# Patient Record
Sex: FEMALE | Race: WHITE
[De-identification: names, ages, dates, MRNs, and addresses within clinical notes are randomized per-mention and may not be internally consistent; named-entity substitution may affect disease eponyms.]

---

## 2017-02-12 ENCOUNTER — HOSPITAL ENCOUNTER (EMERGENCY)
Dept: HOSPITAL 62 - ER | Age: 37
Discharge: HOME | End: 2017-02-12
Payer: MEDICAID

## 2017-02-12 VITALS — DIASTOLIC BLOOD PRESSURE: 89 MMHG | SYSTOLIC BLOOD PRESSURE: 117 MMHG

## 2017-02-12 DIAGNOSIS — Z87.891: ICD-10-CM

## 2017-02-12 DIAGNOSIS — M54.41: Primary | ICD-10-CM

## 2017-02-12 DIAGNOSIS — E03.9: ICD-10-CM

## 2017-02-12 DIAGNOSIS — M79.604: ICD-10-CM

## 2017-02-12 DIAGNOSIS — E11.9: ICD-10-CM

## 2017-02-12 DIAGNOSIS — I10: ICD-10-CM

## 2017-02-12 DIAGNOSIS — Z90.49: ICD-10-CM
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
## 2017-02-12 DIAGNOSIS — Z90.710: ICD-10-CM

## 2017-02-12 PROCEDURE — 99284 EMERGENCY DEPT VISIT MOD MDM: CPT

## 2017-02-12 PROCEDURE — 72110 X-RAY EXAM L-2 SPINE 4/>VWS: CPT

## 2017-02-12 NOTE — ER DOCUMENT REPORT
ED Medical Screen (RME)





- General


Stated Complaint: FALL;BACK PAIN


Time seen by provider: 16:25


Mode of Arrival: Wheelchair


Information source: Patient


Notes: 


36-year-old female presents to ED for fall outside today.  States she landed on 

her back and now she has pain in her back and and right leg.  Denies chronic 

back pain.  She has a history reflux.  States she had a hysterectomy.











I have greeted and performed a rapid initial assessment of this patient.  A 

comprehensive ED assessment and evaluation of the patient, analysis of test 

results and completion of medical decision making process will be conducted by 

an additional ED providers.


TRAVEL OUTSIDE OF THE [place] IN LAST 30 DAYS: No





- Related Data


Allergies/Adverse Reactions: 


 





No Known Allergies Allergy (Unverified 06/09/15 09:33)


 











Past Medical History





- Past Medical History


Cardiac Medical History: Reports: Hx Hypertension


Endocrine Medical History: Reports: Hx Diabetes Mellitus Type 2 - diet control


GI Medical History: Reports: Hx Gastroesophageal Reflux Disease


Past Surgical History: Reports: Hx Cholecystectomy, Hx Gynecologic Surgery - c-

section x3, Hx Hysterectomy





- Immunizations


Immunizations up to date: Yes


Hx Diphtheria, Pertussis, Tetanus Vaccination: Yes





Physical Exam





- Vital signs


Vitals: 





 











Temp Pulse Resp BP Pulse Ox


 


 98.0 F   83   16   123/90 H  97 


 


 02/12/17 16:06  02/12/17 16:06  02/12/17 16:06  02/12/17 16:06  02/12/17 16:06














Course





- Vital Signs


Vital signs: 





 











Temp Pulse Resp BP Pulse Ox


 


 98.0 F   83   16   123/90 H  97 


 


 02/12/17 16:06  02/12/17 16:06  02/12/17 16:06  02/12/17 16:06  02/12/17 16:06

## 2017-02-12 NOTE — ER DOCUMENT REPORT
ED Neck/Back Problem





- General


Chief Complaint: Fall


Stated Complaint: FALL;BACK PAIN


Time seen by provider: 18:17


Mode of Arrival: Wheelchair


Information source: Patient


Notes: 


36-year-old female presents to ED for neck pain after falling outside today.  

She states she landed on her back and now her back hurts with her right leg.  

Has a history of reflux.


TRAVEL OUTSIDE OF THE [place] IN LAST 30 DAYS: No





- HPI


Patient complains to provider of: Pain, Injury, Lower back


Onset: This morning


Where: Outdoors


Onset: Sudden


Timing: Still present


Quality of pain: Sharp


Severity: Moderate


Pain Level: 4


Recent injury: Yes


Associated symptoms: Radiation to leg, Lower back pain.  denies: Incontinence, 

Unable to urinate


Exacerbated by: Movement of trunk


Similar symptoms previously: Yes


Recently seen / treated by doctor: No





- Related Data


Allergies/Adverse Reactions: 


 





No Known Allergies Allergy (Verified 02/12/17 16:27)


 











Past Medical History





- General


Information source: Patient





- Social History


Smoking Status: Former Smoker


Cigarette use (# per day): No


Chew tobacco use (# tins/day): No


Smoking Education Provided: No


Frequency of alcohol use: None


Drug Abuse: None


Occupation: none


Lives with: Family


Family History: Arthritis, Malignancy, Thyroid Disfunction.  denies: CAD, COPD, 

CVA, DM, Hyperlipidemia, Hypertension


Patient has suicidal ideation: No


Patient has homicidal ideation: No





- Past Medical History


Cardiac Medical History: Reports: Hx Hypertension


Pulmonary Medical History: Reports: None


EENT Medical History: Reports: None


Neurological Medical History: Reports: None


Endocrine Medical History: Reports: Hx Diabetes Mellitus Type 2 - diet control, 

Hx Hypothyroidism


Renal/ Medical History: Reports: None


Malignancy Medical History: Reports: None


GI Medical History: Reports: Hx Gastroesophageal Reflux Disease


Musculoskeltal Medical History: Reports Hx Musculoskeletal Deformity, Reports 

Hx Musculoskeletal Trauma


Skin Medical History: Reports None


Psychiatric Medical History: Reports: Hx Anxiety


Traumatic Medical History: Reports: None


Infectious Medical History: Reports: None


Past Surgical History: Reports: Hx Cholecystectomy, Hx Gynecologic Surgery - c-

section x3, Hx Hysterectomy, Hx Thyroid Surgery - Thyroidectomy





- Immunizations


Immunizations up to date: Yes


Hx Diphtheria, Pertussis, Tetanus Vaccination: Yes


History of Influenza Vaccine for 10/2016 - 3/2017 Season: No





Review of Systems





- Review of Systems


Constitutional: No symptoms reported


EENT: No symptoms reported


Cardiovascular: No symptoms reported


Respiratory: No symptoms reported


Gastrointestinal: No symptoms reported


Genitourinary: No symptoms reported


Female Genitourinary: No symptoms reported


Musculoskeletal: Back pain - Lower back radiating down her right leg, Muscle 

pain


Skin: No symptoms reported


Hematologic/Lymphatic: No symptoms reported


Neurological/Psychological: No symptoms reported


-: Yes All other systems reviewed and negative





Physical Exam





- Vital signs


Vitals: 





 











Temp Pulse Resp BP Pulse Ox


 


 98.0 F   83   16   123/90 H  97 


 


 02/12/17 16:06  02/12/17 16:06  02/12/17 16:06  02/12/17 16:06  02/12/17 16:06











Interpretation: Normal





- General


General appearance: Appears well, Alert





- HEENT


Head: Normocephalic, Atraumatic


Eyes: Normal


Pupils: PERRL





- Respiratory


Respiratory status: No respiratory distress


Chest status: Nontender


Breath sounds: Normal


Chest palpation: Normal





- Cardiovascular


Rhythm: Regular


Heart sounds: Normal auscultation


Murmur: No





- Abdominal


Inspection: Normal


Distension: No distension


Bowel sounds: Normal


Tenderness: Nontender


Organomegaly: No organomegaly





- Back


Back: Normal, Nontender, Tender, Vertebra tenderness.  No: Deformity/step-off, 

CVA tenderness, Scars, Scoliosis, Wounds





- Extremities


General upper extremity: Normal inspection, Nontender, Normal color, Normal ROM

, Normal temperature


General lower extremity: Normal inspection, Nontender, Normal color, Normal ROM

, Normal temperature, Normal weight bearing.  No: Gayle's sign





- Neurological


Neuro grossly intact: Yes


Cognition: Normal


Orientation: AAOx4


[place] Coma Scale Eye Opening: Spontaneous


Lin Coma Scale Verbal: Oriented


Lin Coma Scale Motor: Obeys Commands


[place] Coma Scale Total: 15


Speech: Normal


Motor strength normal: LUE, RUE, LLE, RLE


Sensory: Normal





- Psychological


Associated symptoms: Normal affect, Normal mood





- Skin


Skin Temperature: Warm


Skin Moisture: Dry


Skin Color: Normal





Course





- Re-evaluation


Re-evalutation: 





02/12/17 18:23


No cauda equina.  Patient has some numbness down her right leg to her toe.  

Pain also radiates down the right side.  No loss of sensation no loss of 

control of bowel or bladder.  Treated patient with some ibuprofen in the 

emergency room we'll discharge home with some Flexeril and hydrocodone and have 

follow-up with her primary doctor.





- Vital Signs


Vital signs: 





 











Temp Pulse Resp BP Pulse Ox


 


 98.0 F   83   16   123/90 H  97 


 


 02/12/17 16:06  02/12/17 16:06  02/12/17 16:06  02/12/17 16:06  02/12/17 16:06














- Diagnostic Test


Radiology reviewed: Image reviewed, Reports reviewed





Discharge





- Discharge


Clinical Impression: 


Low back pain


Qualifiers:


 Chronicity: acute Back pain laterality: midline Sciatica presence: with 

sciatica Sciatica laterality: sciatica of right side Qualified Code(s): M54.41 

- Lumbago with sciatica, right side





Condition: Stable


Disposition: HOME, SELF-CARE


Instructions:  Stretching Exercises for the Back (Critical access hospital), Family Physicians / 

Practices


Additional Instructions: 


LOW BACK PAIN:





     Three out of every four people will have an episode of disabling back pain 

during their lifetime.  Most commonly the pain is due to straining of the 

muscles and ligaments in the low back.


     Usual treatment includes: 


(1) Rest on a firm surface.  Avoid lying on your stomach.  


(2) Ice pack the painful area.  After a few days, gentle heat may be used 

intermittently to relax the area, or ice packs can be continued.  


(3) Medication may be needed -- muscle relaxers and antiinflammatory medicines 

are commonly used.  


(4) As the back improves, exercises are prescribed to strengthen the back and 

abdominal muscles.


     Your doctor will advise you on the proper care for your back at each stage 

in your recovery.  You may be better in a few days -- or healing may take 

several weeks.


     If new symptoms of a "herniated disc" (radiation of pain, numbness, or 

tingling down the back of the leg or weakness in the leg) occur, you should be 

re-examined.  Further testing may be necessary.








ORAL NARCOTIC MEDICATION:


     You have been given a prescription for pain control.  This medication is a 

narcotic.  It's best taken with food, as nausea can result if taken on an empty 

stomach.


     Don't operate machinery or drive within six hours of taking this 

medication.  Do not combine this medicine with alcohol, or with any medication 

which can cause sedation (such as cold tablets or sleeping pills) unless you 

get permission from the physician.


     Narcotics tend to cause constipation.  If possible, drink plenty of fluids 

and eat a diet high in fiber and fruits.





     Please be aware that prescription narcotics also have the potential for 

abuse.  People become addicted to these medications because of the general 

sense of wellbeing that they induce.  This feeling along with a significant 

reduction in tension, anxiety, and aggression provides a stimulating seductive 

quality to these drugs.  Once your pain is under control, we encourage you to 

discard your unused narcotics.








MUSCLE RELAXERS: 


     Muscle relaxing medications are usually prescribed for acute muscle spasm 

or injury to the neck and back.  They are often combined with antiinflammatory 

pain medication for increased relief.


     You may stop the muscle relaxer when the pain and stiffness have improved.

  Start the medication again if spasms recur.  


     Muscle relaxers may cause drowsiness, especially with the first dose.  Do 

not operate machinery or drive while under the effects of the medication.  Most 

muscle relaxers last up to 24 hours.  Do not combine the medication with 

alcohol.








ICE PACKS:


     Apply ice packs frequently against the painful area.  Many different 

schedules are recommended, such as "20 minutes on, 20 minutes off" or "one hour 

ice, two hours rest."  If you need to work, you may need to go longer between 

ice treatments.  You should plan to have the area ice packed AT LEAST one 

fourth of the time.


     The ice should be applied over the wrap, tape, or splint, or over a layer 

of cloth -- not directly against the skin.  Some ice bags have a built-in cloth 

and can be put directly on the skin.





WARM PACKS:


     After approximately two days, apply gentle heat (such as a heating pad or 

hot water bottle) for about 20 to 30 minutes about every two hours -- at least 

four times daily.  Warmth and elevation will help you make a more rapid recovery

, and will ease the pain considerably.


     Do not use HOT heat, and never apply heat for longer than 30 minutes.  The 

continuous heat can invisibly damage skin and muscles -- even when no burn is 

seen on the surface.  Damaged muscles can make you MORE sore.








FOLLOW-UP CARE:


If you have been referred to a physician for follow-up care, call the physician

s office for an appointment as you were instructed or within the next two days.

  If you experience worsening or a significant change in your symptoms, notify 

the physician immediately or return to the Emergency Department at any time for 

re-evaluation.


Prescriptions: 


Hydrocodone/Acetaminophen [Norco 5-325 mg Tablet] 1 tab PO Q6HP PRN #14 tablet


 PRN Reason: 


Ibuprofen 600 mg PO Q6HP PRN #20 tablet


 PRN Reason: 


Cyclobenzaprine HCl [Flexeril 5 mg Tablet] 5 mg PO TID #15 tablet


Forms:  Elevated Blood Pressure

## 2017-12-04 ENCOUNTER — HOSPITAL ENCOUNTER (EMERGENCY)
Dept: HOSPITAL 62 - ER | Age: 37
LOS: 1 days | Discharge: HOME | End: 2017-12-05
Payer: SELF-PAY

## 2017-12-04 DIAGNOSIS — M79.604: ICD-10-CM

## 2017-12-04 DIAGNOSIS — R60.0: ICD-10-CM

## 2017-12-04 DIAGNOSIS — M79.605: ICD-10-CM

## 2017-12-04 DIAGNOSIS — R53.83: ICD-10-CM

## 2017-12-04 DIAGNOSIS — R20.0: ICD-10-CM

## 2017-12-04 DIAGNOSIS — F17.200: ICD-10-CM

## 2017-12-04 DIAGNOSIS — R07.9: Primary | ICD-10-CM

## 2017-12-04 DIAGNOSIS — Z79.899: ICD-10-CM

## 2017-12-04 DIAGNOSIS — E03.9: ICD-10-CM

## 2017-12-04 LAB
ALBUMIN SERPL-MCNC: 4.6 G/DL (ref 3.5–5)
ALP SERPL-CCNC: 76 U/L (ref 38–126)
ALT SERPL-CCNC: 32 U/L (ref 9–52)
ANION GAP SERPL CALC-SCNC: 11 MMOL/L (ref 5–19)
APPEARANCE UR: (no result)
AST SERPL-CCNC: 31 U/L (ref 14–36)
BACTERIA #/AREA URNS HPF: (no result) /HPF
BASOPHILS # BLD AUTO: 0.1 10^3/UL (ref 0–0.2)
BASOPHILS NFR BLD AUTO: 0.6 % (ref 0–2)
BILIRUB DIRECT SERPL-MCNC: 0.3 MG/DL (ref 0–0.4)
BILIRUB SERPL-MCNC: 0.3 MG/DL (ref 0.2–1.3)
BILIRUB UR QL STRIP: NEGATIVE
BUN SERPL-MCNC: 12 MG/DL (ref 7–20)
CALCIUM: 8.2 MG/DL (ref 8.4–10.2)
CHLORIDE SERPL-SCNC: 103 MMOL/L (ref 98–107)
CO2 SERPL-SCNC: 29 MMOL/L (ref 22–30)
CREAT SERPL-MCNC: 0.77 MG/DL (ref 0.52–1.25)
EOSINOPHIL # BLD AUTO: 0.3 10^3/UL (ref 0–0.6)
EOSINOPHIL NFR BLD AUTO: 3.3 % (ref 0–6)
ERYTHROCYTE [DISTWIDTH] IN BLOOD BY AUTOMATED COUNT: 16.2 % (ref 11.5–14)
GLUCOSE SERPL-MCNC: 86 MG/DL (ref 75–110)
GLUCOSE UR STRIP-MCNC: NEGATIVE MG/DL
HCT VFR BLD CALC: 37.3 % (ref 36–47)
HGB BLD-MCNC: 12.4 G/DL (ref 12–15.5)
HGB HCT DIFFERENCE: -0.1
KETONES UR STRIP-MCNC: NEGATIVE MG/DL
LYMPHOCYTES # BLD AUTO: 4.4 10^3/UL (ref 0.5–4.7)
LYMPHOCYTES NFR BLD AUTO: 45.6 % (ref 13–45)
MCH RBC QN AUTO: 27.4 PG (ref 27–33.4)
MCHC RBC AUTO-ENTMCNC: 33.2 G/DL (ref 32–36)
MCV RBC AUTO: 83 FL (ref 80–97)
MONOCYTES # BLD AUTO: 0.6 10^3/UL (ref 0.1–1.4)
MONOCYTES NFR BLD AUTO: 6.6 % (ref 3–13)
NEUTROPHILS # BLD AUTO: 4.3 10^3/UL (ref 1.7–8.2)
NEUTS SEG NFR BLD AUTO: 43.9 % (ref 42–78)
NITRITE UR QL STRIP: NEGATIVE
PH UR STRIP: 6 [PH] (ref 5–9)
POTASSIUM SERPL-SCNC: 4.1 MMOL/L (ref 3.6–5)
PROT SERPL-MCNC: 7.4 G/DL (ref 6.3–8.2)
PROT UR STRIP-MCNC: NEGATIVE MG/DL
RBC # BLD AUTO: 4.52 10^6/UL (ref 3.72–5.28)
SODIUM SERPL-SCNC: 142.8 MMOL/L (ref 137–145)
SP GR UR STRIP: 1.01
UROBILINOGEN UR-MCNC: NEGATIVE MG/DL (ref ?–2)
WBC # BLD AUTO: 9.7 10^3/UL (ref 4–10.5)
WBC #/AREA URNS HPF: (no result) /HPF

## 2017-12-04 PROCEDURE — 71020: CPT

## 2017-12-04 PROCEDURE — 93005 ELECTROCARDIOGRAM TRACING: CPT

## 2017-12-04 PROCEDURE — 93010 ELECTROCARDIOGRAM REPORT: CPT

## 2017-12-04 PROCEDURE — 99285 EMERGENCY DEPT VISIT HI MDM: CPT

## 2017-12-04 PROCEDURE — 81001 URINALYSIS AUTO W/SCOPE: CPT

## 2017-12-04 PROCEDURE — 84439 ASSAY OF FREE THYROXINE: CPT

## 2017-12-04 PROCEDURE — 81025 URINE PREGNANCY TEST: CPT

## 2017-12-04 PROCEDURE — 85025 COMPLETE CBC W/AUTO DIFF WBC: CPT

## 2017-12-04 PROCEDURE — 36415 COLL VENOUS BLD VENIPUNCTURE: CPT

## 2017-12-04 PROCEDURE — 80053 COMPREHEN METABOLIC PANEL: CPT

## 2017-12-04 PROCEDURE — 84484 ASSAY OF TROPONIN QUANT: CPT

## 2017-12-04 PROCEDURE — 84443 ASSAY THYROID STIM HORMONE: CPT

## 2017-12-04 NOTE — EKG REPORT
SEVERITY:- BORDERLINE ECG -

SINUS RHYTHM

BORDERLINE T ABNORMALITIES, ANT-LAT LEADS

:

Confirmed by: Iban Acharya 04-Dec-2017 20:46:16

## 2017-12-04 NOTE — ER DOCUMENT REPORT
ED Medical Screen (RME)





- General


Chief Complaint: Chest Pain


Stated Complaint: LEFT ARM PAIN


Time Seen by Provider: 12/04/17 19:30


Notes: 





Patient states that she has had about 1 week of chest pain left arm pain 

fatigue and tiredness with bilateral leg swelling.


TRAVEL OUTSIDE OF THE [place] IN LAST 30 DAYS: No





- Related Data


Allergies/Adverse Reactions: 


 





ketorolac [From Toradol] Allergy (Verified 12/04/17 17:33)


 


morphine Allergy (Verified 12/04/17 17:33)


 








Home Medications: 


 Current Home Medications





Esomeprazole Magnesium [Nexium] 40 mg PO DAILY 12/04/17 [History]


Levothyroxine Sodium [Synthroid 0.112 mg Tablet] 112 mcg PO DAILY 12/04/17 [

History]











Past Medical History





- Social History


Chew tobacco use (# tins/day): No


Frequency of alcohol use: None


Drug Abuse: None





- Past Medical History


Cardiac Medical History: Reports: Hx Hypertension


Endocrine Medical History: Reports: Hx Diabetes Mellitus Type 2 - diet control, 

Hx Hypothyroidism


Renal/ Medical History: Denies: Hx Peritoneal Dialysis


GI Medical History: Reports: Hx Gastroesophageal Reflux Disease


Musculoskeltal Medical History: Reports Hx Musculoskeletal Deformity, Reports 

Hx Musculoskeletal Trauma


Psychiatric Medical History: Reports: Hx Anxiety


Past Surgical History: Reports: Hx Cholecystectomy, Hx Gynecologic Surgery - c-

section x3, Hx Hysterectomy, Hx Thyroid Surgery - Thyroidectomy





- Immunizations


Immunizations up to date: Yes


Hx Diphtheria, Pertussis, Tetanus Vaccination: Yes





Physical Exam





- Vital signs


Vitals: 





 











Temp Pulse Resp BP Pulse Ox


 


 97.6 F   54 L  16   113/71   100 


 


 12/04/17 17:52  12/04/17 17:52  12/04/17 17:52  12/04/17 17:52  12/04/17 17:52














Course





- Vital Signs


Vital signs: 





 











Temp Pulse Resp BP Pulse Ox


 


 97.6 F   54 L  16   113/71   100 


 


 12/04/17 17:52  12/04/17 17:52  12/04/17 17:52  12/04/17 17:52  12/04/17 17:52

## 2017-12-05 VITALS — DIASTOLIC BLOOD PRESSURE: 75 MMHG | SYSTOLIC BLOOD PRESSURE: 119 MMHG

## 2017-12-05 LAB — TSH SERPL-ACNC: 7.52 UIU/ML (ref 0.47–4.68)

## 2017-12-05 NOTE — ER DOCUMENT REPORT
ED General





- General


Chief Complaint: Chest Pain


Stated Complaint: LEFT ARM PAIN


Time Seen by Provider: 12/04/17 19:30


Notes: 





Patient is a 37-year-old female with past medical history of hypothyroidism who 

presents with 3-4 days of bilateral lower extremity pain, chest pain and 

intermittent tingling in her left upper extremity.  Patient reports that her 

main concern and the reason she came to the emergency department today is 

regarding her bilateral lower extremity edema.  She notes that it is equal and 

symmetric in both her legs and causes a tense, continuous compression feeling 

to her lower extremities.  Nothing improves or worsens her symptoms.  She 

denies any history of similar symptoms in the past.  She denies any history of 

kidney failure, CHF, liver failure, or prior thromboembolic disease.  She has 

not seen a primary care doctor regarding this concern.  She also notes for the 

past 2 days she has had a left-sided chest discomfort that is described as an 

intermittent, burning pain.  She also notes that she feels like her left arm 

intermittently falls asleep and does not notice that this appears to be 

correlated with the chest pain itself.  She has no history of DVT or pulmonary 

embolus.  She denies any cardiac history.  Nothing seems to improve or worsen 

her symptoms.


TRAVEL OUTSIDE OF THE [place] IN LAST 30 DAYS: No





- Related Data


Allergies/Adverse Reactions: 


 





ketorolac [From Toradol] Allergy (Verified 12/04/17 17:33)


 


morphine Allergy (Verified 12/04/17 17:33)


 








Home Medications: 


 Current Home Medications





Esomeprazole Magnesium [Nexium] 40 mg PO DAILY 12/04/17 [History]


Levothyroxine Sodium [Synthroid 0.112 mg Tablet] 112 mcg PO DAILY 12/04/17 [

History]











Past Medical History





- General


Information source: Patient





- Social History


Smoking Status: Current Every Day Smoker


Chew tobacco use (# tins/day): No


Frequency of alcohol use: None


Drug Abuse: None


Lives with: Spouse/Significant other


Family History: Arthritis, Malignancy, Thyroid Disfunction.  denies: CAD, COPD, 

CVA, DM, Hyperlipidemia, Hypertension


Patient has suicidal ideation: No


Patient has homicidal ideation: No





- Past Medical History


Cardiac Medical History: Reports: Hx Hypertension


Endocrine Medical History: Reports: Hx Diabetes Mellitus Type 2 - diet control, 

Hx Hypothyroidism


Renal/ Medical History: Denies: Hx Peritoneal Dialysis


GI Medical History: Reports: Hx Gastroesophageal Reflux Disease


Musculoskeltal Medical History: Reports Hx Musculoskeletal Deformity, Reports 

Hx Musculoskeletal Trauma


Psychiatric Medical History: Reports: Hx Anxiety


Past Surgical History: Reports: Hx Cholecystectomy, Hx Gynecologic Surgery - c-

section x3, Hx Hysterectomy, Hx Thyroid Surgery - Thyroidectomy





- Immunizations


Immunizations up to date: Yes


Hx Diphtheria, Pertussis, Tetanus Vaccination: Yes





Review of Systems





- Review of Systems


Notes: 





Constitutional: Negative for fever.


HENT: Negative for sore throat.


Eyes: Negative for visual changes.


Cardiovascular: Positive for chest pain.


Respiratory: Negative for shortness of breath.


Gastrointestinal: Negative for abdominal pain, vomiting or diarrhea.


Genitourinary: Negative for dysuria.


Musculoskeletal: Positive for bilateral lower extremity edema


Skin: Negative for rash.


Neurological: Negative for headaches, weakness or numbness.





10 point ROS negative except as marked above and in HPI.





Physical Exam





- Vital signs


Vitals: 


 











Temp Pulse Resp BP Pulse Ox


 


 97.6 F   54 L  16   113/71   100 


 


 12/04/17 17:52  12/04/17 17:52  12/04/17 17:52  12/04/17 17:52  12/04/17 17:52











Interpretation: Bradycardic


Notes: 





PHYSICAL EXAMINATION:





GENERAL: Well-appearing, well-nourished and in no acute distress.





HEAD: Atraumatic, normocephalic.





EYES: Pupils equal round and reactive to light, extraocular movements intact, 

sclera anicteric, conjunctiva are normal.





ENT: nares patent, oropharynx clear without exudates.  Moist mucous membranes.





NECK: Normal range of motion, supple without lymphadenopathy





LUNGS: Breath sounds clear to auscultation bilaterally and equal.  No wheezes 

rales or rhonchi.





HEART: Regular rate and rhythm without murmurs





ABDOMEN: Soft, nontender, normoactive bowel sounds.  No guarding, no rebound.  

No masses appreciated.





EXTREMITIES: Normal range of motion, 1+ pitting edema in the bilateral lower 

extremities that is equal and symmetric to the level of the mid tibia 

bilaterally





NEUROLOGICAL: No focal neurological deficits. Moves all extremities 

spontaneously and on command.





PSYCH: Normal mood, normal affect.





SKIN: Warm, Dry, normal turgor, no rashes or lesions noted.





Course





- Re-evaluation


Re-evalutation: 





12/05/17 00:00


Patient presents with multiple complaints.





1.)  Chest pain: Presentation of chest pain in an otherwise well appearing 

patient. Low clinical suspicion for ACS given clinical history, exam, EKG 

without ST elevations or depressions, and negative initial troponin. HEART 

score less than or equal to 3. PE also seems unlikely given clinical history, 

absence of tachycardia or dyspnea. Patient is PERC criteria negative. CXR 

without evidence of pneumothorax or pneumonia. No widened mediastinum. Aortic 

dissection also seems unlikely given history, symmetric pulses, CXR, and 

vitals.  Repeat troponin 3 hours from initial remains normal.





2.)  Bilateral lower extremity edema: Patient does have equal and symmetric 1+ 

pitting edema in the bilateral lower extremities.  Exact etiology of this is 

somewhat unclear.  Patient does not have any evidence of liver failure, renal 

failure, or congestive heart failure on labs or exam.  She is not pregnant.  

The edema is symmetric and I do not suspect an acute DVT.  Very low clinical 

suspicion for an inferior vena cava occlusion as the edema is actually quite 

mild and she does not have any edema past her mid tibial region.  2+ DP pulses 

bilaterally.  An alternative consideration would be secondary to her 

hypothyroidism although this is currently being treated.  Will obtain thyroid 

labs to further evaluate.  If this is unremarkable plan for a compression 

stocking application and close outpatient follow-up


12/05/17 00:58


Free T4 level is normal.  Patient remains without any further chest pain.  At 

this time will discharge with return precautions and follow-up recommendations.

  Verbal discharge instructions given a the bedside and opportunity for 

questions given. Medication warnings reviewed. Patient is in agreement with 

this plan and has verbalized understanding of return precautions and the need 

for primary care follow-up in the next 24-72 hours.


12/05/17 03:51








- Vital Signs


Vital signs: 


 











Temp Pulse Resp BP Pulse Ox


 


 97.9 F   62   16   119/75   100 


 


 12/05/17 01:08  12/05/17 01:08  12/05/17 01:08  12/05/17 01:08  12/05/17 01:08














- Laboratory


Result Diagrams: 


 12/04/17 20:15





 12/04/17 20:15


Laboratory results interpreted by me: 


 











  12/04/17 12/04/17 12/04/17





  20:15 20:15 20:15


 


RDW  16.2 H  


 


Lymphocytes %  45.6 H  


 


Calcium   8.2 L 


 


TSH   


 


Urine Blood    SMALL H


 


Ur Leukocyte Esterase    TRACE H














  12/04/17





  20:15


 


RDW 


 


Lymphocytes % 


 


Calcium 


 


TSH  7.52 H


 


Urine Blood 


 


Ur Leukocyte Esterase 














- Diagnostic Test


Radiology reviewed: Image reviewed, Reports reviewed


Radiology results interpreted by me: 





12/05/17 00:02


Chest x-ray: No acute pulmonary edema or cardiomegaly





- EKG Interpretation by Me


Additional EKG results interpreted by me: 





12/05/17 00:03


Sinus bradycardia.  Rate 50.  No ST elevations or depressions.  QTC is 387.





Discharge





- Discharge


Clinical Impression: 


 Bilateral lower extremity edema





Chest pain


Qualifiers:


 Chest pain type: unspecified Qualified Code(s): R07.9 - Chest pain, unspecified





Condition: Good


Disposition: HOME, SELF-CARE


Additional Instructions: 


Your thyroid level is normal today as are all of your other labs.  The exact 

cause of your lower extremity swelling is uncertain at this time but you should 

follow-up with your primary care doctor in the next 24-48 hours for ongoing 

evaluation.  At this time we do not suspect any life-threatening cause of your 

symptoms today.  Please return for worsening symptoms, persistent vomiting, 

fever, or any other symptoms that are worrisome to you.


Referrals: 


THOMAS PARISI MD [Primary Care Provider] - Follow up tomorrow

## 2018-01-04 ENCOUNTER — HOSPITAL ENCOUNTER (EMERGENCY)
Dept: HOSPITAL 62 - ER | Age: 38
Discharge: HOME | End: 2018-01-04
Payer: SELF-PAY

## 2018-01-04 VITALS — SYSTOLIC BLOOD PRESSURE: 117 MMHG | DIASTOLIC BLOOD PRESSURE: 84 MMHG

## 2018-01-04 DIAGNOSIS — R05: ICD-10-CM

## 2018-01-04 DIAGNOSIS — E11.9: ICD-10-CM

## 2018-01-04 DIAGNOSIS — J11.08: Primary | ICD-10-CM

## 2018-01-04 DIAGNOSIS — Z71.6: ICD-10-CM

## 2018-01-04 DIAGNOSIS — Z88.8: ICD-10-CM

## 2018-01-04 DIAGNOSIS — J34.89: ICD-10-CM

## 2018-01-04 DIAGNOSIS — R50.9: ICD-10-CM

## 2018-01-04 DIAGNOSIS — F17.210: ICD-10-CM

## 2018-01-04 DIAGNOSIS — R09.81: ICD-10-CM

## 2018-01-04 DIAGNOSIS — R09.82: ICD-10-CM

## 2018-01-04 DIAGNOSIS — R06.02: ICD-10-CM

## 2018-01-04 LAB
A TYPE INFLUENZA AG: NEGATIVE
B INFLUENZA AG: POSITIVE

## 2018-01-04 PROCEDURE — 71046 X-RAY EXAM CHEST 2 VIEWS: CPT

## 2018-01-04 PROCEDURE — 99284 EMERGENCY DEPT VISIT MOD MDM: CPT

## 2018-01-04 PROCEDURE — 87804 INFLUENZA ASSAY W/OPTIC: CPT

## 2018-01-04 NOTE — ER DOCUMENT REPORT
ED Respiratory Problem





- General


Chief Complaint: Cold Symptoms


Stated Complaint: FEVER,CHILLS,HEADACHE,SORE THROAT


Time Seen by Provider: 01/04/18 15:39


Mode of Arrival: Ambulatory


Information source: Patient


Notes: 





37-year-old female presents to ED for complaint of cough cold congestion fever 

and general body aches with a sore throat for over a week.  She states she went 

to her primary care doctor and got some penicillin and is not getting any 

better.  Patient states she had a fever today of 103.8 and took some ibuprofen.

  Patient is in no acute distress vital signs are stable totally afebrile in 

the emergency room of 98.0 pulse 59 blood pressure 112/73 respirations 16 with 

a sat of 100%.


TRAVEL OUTSIDE OF THE [place] IN LAST 30 DAYS: No





- HPI


Patient complains to provider of: Cough, Other - Congestion sore throat runny 

nose fever


Onset: Last week


Duration: Continuous


Initiating Event: URI


Quality of pain: Achy


Severity: Moderate


Pain Level: 3


Context: Smoker


Cough: Productive


Sputum amount: Small


Sputum color: Yellow


Sputum consistency: Thick


Associated symptoms: Congestion, Cough, Fever, PND, Runny nose, Sinus pain/

pressure, Short of breath, Sore Throat, Other


Similar symptoms previously: Yes


Recently seen / treated by doctor: Yes





- Related Data


Allergies/Adverse Reactions: 


 





ketorolac [From Toradol] Allergy (Verified 01/04/18 14:46)


 


morphine Adverse Reaction (Verified 01/04/18 14:46)


 VOMITING











Past Medical History





- General


Information source: Patient





- Social History


Smoking Status: Current Every Day Smoker


Cigarette use (# per day): Yes - One half pack per day


Chew tobacco use (# tins/day): No


Smoking Education Provided: Yes - 4 minutes


Frequency of alcohol use: None


Drug Abuse: None


Occupation: None


Lives with: Family


Family History: Arthritis, Malignancy, Thyroid Disfunction.  denies: CAD, COPD, 

CVA, DM, Hyperlipidemia, Hypertension





- Past Medical History


Cardiac Medical History: Reports: Hx Hypertension - Only when pregnant


Pulmonary Medical History: Reports: Hx Bronchitis


EENT Medical History: Reports: None


Neurological Medical History: Reports: None


Endocrine Medical History: Reports: Hx Diabetes Mellitus Type 2 - diet control, 

Hx Hypothyroidism


Renal/ Medical History: Reports: None


Malignancy Medical History: Reports: None


GI Medical History: Reports: Hx Gastroesophageal Reflux Disease


Musculoskeltal Medical History: Reports Hx Musculoskeletal Deformity, Reports 

Hx Musculoskeletal Trauma


Skin Medical History: Reports None


Psychiatric Medical History: Reports: Hx Anxiety


Traumatic Medical History: Reports: None


Infectious Medical History: Reports: None


Past Surgical History: Reports: Hx Cholecystectomy, Hx Gynecologic Surgery - c-

section x3, Hx Hysterectomy, Hx Thyroid Surgery - Thyroidectomy





- Immunizations


Immunizations up to date: Yes


Hx Diphtheria, Pertussis, Tetanus Vaccination: Yes





Review of Systems





- Review of Systems


Constitutional: Fever, Recent illness


EENT: Nose congestion, Nose discharge, Sinus discharge, Throat pain


Cardiovascular: No symptoms reported


Respiratory: Cough, Short of breath, Sputum


Gastrointestinal: No symptoms reported


Genitourinary: No symptoms reported


Female Genitourinary: No symptoms reported


Musculoskeletal: No symptoms reported


Skin: No symptoms reported


Hematologic/Lymphatic: No symptoms reported


Neurological/Psychological: No symptoms reported


-: Yes All other systems reviewed and negative





Physical Exam





- Vital signs


Vitals: 


 











Temp Pulse Resp BP Pulse Ox


 


 98.0 F   59 L  16   112/73   100 


 


 01/04/18 14:48  01/04/18 14:48  01/04/18 14:48  01/04/18 14:48  01/04/18 14:48











Interpretation: Normal





- General


General appearance: Appears well, Alert





- HEENT


Head: Normocephalic, Atraumatic


Eyes: Normal


Pupils: PERRL


Ears: Normal


External canal: Normal


Tympanic membrane: Normal


Sinus: Frontal


Nasal: Purulent discharge, Swelling


Mouth/Lips: Normal


Mucous membranes: Normal


Pharynx: Post nasal drainage.  No: Erythema, Exudate, Peritonsillar abscess, 

Retropharyngeal abscess, Tonsillar hypertrophy, Uvular edema, Potential airway 

comprom.


Neck: Normal





- Respiratory


Respiratory status: No respiratory distress


Chest status: Nontender


Breath sounds: Nonproductive cough.  No: Productive cough, Rales, Rhonchi, 

Stridor, Wheezing


Chest palpation: Normal





- Cardiovascular


Rhythm: Regular


Heart sounds: Normal auscultation


Murmur: No





- Abdominal


Inspection: Normal


Distension: No distension


Bowel sounds: Normal


Tenderness: Nontender


Organomegaly: No organomegaly





- Back


Back: Normal, Nontender





- Extremities


General upper extremity: Normal inspection, Nontender, Normal color, Normal ROM

, Normal temperature


General lower extremity: Normal inspection, Nontender, Normal color, Normal ROM

, Normal temperature, Normal weight bearing.  No: Gayle's sign





- Neurological


Neuro grossly intact: Yes


Cognition: Normal


Orientation: AAOx4


[place] Coma Scale Eye Opening: Spontaneous


[place] Coma Scale Verbal: Oriented


[place] Coma Scale Motor: Obeys Commands


[place] Coma Scale Total: 15


Speech: Normal


Motor strength normal: LUE, RUE, LLE, RLE


Sensory: Normal





- Psychological


Associated symptoms: Normal affect, Normal mood





- Skin


Skin Temperature: Warm


Skin Moisture: Dry


Skin Color: Normal





Course





- Re-evaluation


Re-evalutation: 





01/04/18 15:55


Patient states she went to the primary doctor to penicillin which did not help 

her at all.


01/04/18 18:40


Influenza B was positive and patient was also x-ray showed a possible was 

treated with doxycycline and discharged home with a prescription for 

doxycycline and instructions to follow-up with her primary doctor.  Patient was 

given instructions on influenza and decrease of spread.





- Vital Signs


Vital signs: 


 











Temp Pulse Resp BP Pulse Ox


 


 97.4 F   57 L  16   117/84   99 


 


 01/04/18 17:07  01/04/18 17:07  01/04/18 17:07  01/04/18 17:07  01/04/18 17:07














- Diagnostic Test


Radiology reviewed: Image reviewed, Reports reviewed





Discharge





- Discharge


Clinical Impression: 


 Influenza B





Pneumonia


Qualifiers:


 Pneumonia type: due to unspecified organism Laterality: left Lung location: 

lower lobe of lung Qualified Code(s): J18.1 - Lobar pneumonia, unspecified 

organism





Condition: Stable


Disposition: HOME, SELF-CARE


Additional Instructions: 


PNEUMONIA:





     Your examination indicates that you have pneumonia.  This is an infection 

of the lung tissue, usually caused by bacteria or a virus. Symptoms include 

cough, fever, shaking chills, chest pain, shortness of breath, and coughing up 

bloody sputum.


     Treatment for bacterial pneumonia includes rest, antibiotics for 10 to 14 

days, increasing your clear liquid intake, a cool mist humidifier at your 

bedside, and fever medication.  Often, a repeat chest X-ray is performed in a 

few weeks--even if you feel better--to ascertain whether the infection has 

completely resolved and no underlying lung problem is present.


     You should call the physician if you develop persistent vomiting, high 

fever that does not respond to fever medication, increasing shortness of breath

, confusion, or lethargy.  Also, failure to improve within two to three days is 

an indication for re-examination.











DOXYCYCLINE:


     Doxycycline (Vibramycin, Doryx) is an antibiotic of the tetracycline 

family.  This type of drug is useful for infections of the respiratory tract 

and genital tract, and is sometimes used for intestinal infections.


     Unlike most tetracyclines, doxycycline can be taken with food.  It is 

longer acting, and (usually) less prone to side effects than regular 

tetracycline.


     Tetracycline antibiotics can stain immature teeth and SHOULD NOT BE TAKEN 

BY CHILDREN, NURSING MOTHERS, OR PREGNANT WOMEN.


     Tetracyclines can make you more prone to sunburn.  Abdominal cramping, 

nausea, and diarrhea are occasional side effects.  Women may experience vaginal 

yeast infections.


     Call the doctor at once if you develop hives, itching, shortness of breath

, or lightheadedness.





INFLUENZA:





     The physician feels that you have influenza -- the "flu".  Influenza is an 

infection caused by a virus.  Symptoms include generalized aching, fever, 

headache, dry cough, and fatigue.  Some patients with the flu also have nausea, 

vomiting, and diarrhea.  The fever and aches usually last two to four days, 

with the cough persisting another one to two weeks.


     Treatment of the flu, for the most part, is simply treatment of symptoms. 

Rest, drink plenty of fluids, and use acetaminophen for fever and aches.  Do 

not take aspirin.  There is an anti-viral medication, called Tamiflu, which may 

help in "type A" flu, but it's not helpful in every case of flu, and only works 

if started within the first 24 - 48 hours of the start of symptoms.  The 

physician will determine whether this medication can help you.


     To prevent spread of the virus, use good handwashing.  Shared toys should 

be cleaned with disinfectant. Clean the toilets, sinks, and counter surfaces in 

bathrooms. Launder clothing in hot water.





What are conditions that should receive medical attention?


   The development of difficulty breathing.


   Lip color changes to blue or purple.


   Persistent vomiting and unable to keep liquids down with 


     signs of dehydration such as: dizziness when standing, unable to urinate, 

or if   child/infant is crying no tears are noticed.


   Is less responsive than normal or becomes confused. 





How do I decrease the spread of flu in my home?


Taking care of the sick patient at home:


   Keep the sick person in a room separate from the common areas of the house. 

Keep the "sickroom" door closed. 


   If the person with the flu needs to leave the home, they should cover their 

nose/mouth when coughing or sneezing and wear a disposable (surgical) mask if 

available. These masks may be available at your local pharmacy, medical supply 

and hardware store.


   If the sick person is in common areas of the house, have them wear a 

surgical mask.


   If possible, have the sick person use a separate bathroom that should be 

cleaned daily with a household disinfectant.


If you are the caregiver:


   Avoid being face to face with the sick adult person as much as possible.


   Try to stay at least 6 feet away and wear a disposable surgical mask when 

possible.


   When holding small children who are sick, place their chin on your shoulder 

so that they will not cough in your face.


   Wash your hands after you touch the sick person or handle their tissues and 

laundry.


   Wear a mask if you leave home, as you may be infected from taking care of 

someone and not know it yet.


   Watch yourself and others in the home for flu symptoms and contact your 

doctor if symptoms occur.  NOTE: Antiviral medication used to reduce the 

symptoms of the flu works only if taken within 48 hours, and best within 24 

hours of symptom onset.


Household Cleaning, laundry and waste disposal:


   Tissues and other disposable items used by the sick person should be thrown 

away in the trash. Wash your  hands after touching these used items. No special 

waste disposal is required.


   Keep surfaces (especially bedside tables, bathroom surfaces, and toys for 

children) clean by wiping them down with a safe household disinfectant 

according to the directions on the product label.





Per Center for Disease Control advice, most people will not receive testing to 

confirm flu. Also based on the person's health history and onset of symptoms, 

not all patients will receive prescriptions for antiviral medications. If you 

have questions related to this, please ask your healthcare provider.





 For more information, you can call the Centers for Disease Control and 

Prevention (CDC) Hotline at 3-212-XVW-INFO


 This line is available in English and Mongolian, 24 hours a day, 7 days a week. 

Or www.Open English or www.cdc.gov








Flu-Like Illness Home Instructions:





The influenza virus infection can cause a wide rage of symptoms, including:


   Fever, cough, sore throat, body aches, headaches, chills, fatigue, with 

some patients reporting diarrhea and vomiting


   Like seasonal influenza A, H1N1 ("swine flu")in humans can vary in severity 

from mild to severe


   Severe illness with pneumonia, respiratory failure and even death is 

possible


   Certain groups might be more likely to develop a severe illness from H1N1 

infection.


   Sometimes bacterial infections may occur at the same time as or after 

infection with influenza viruses and lead to pneumonias, ear infections, or 

sinus infections.





How Flu Spreads


   The main way that influenza viruses spread is through respiratory droplets 

of coughs and sneezes. This can happen when someone with the infection coughs 

or sneezes and the particles fly through the air and land on other people and 

surfaces. If the person covers their mouth and nose with their hand but does 

not wash their hands immediately, then these germs are passed onto the next 

object that they touch.





People with Influenza A or suspected H1N1 (swine flu) who are cared for at home 

should:


   Check with their doctor about any special care that they might need if they 

are pregnant or have a health condition such as diabetes, heart disease, asthma 

or emphysema.


   Also, limit caregiver to one (if possible). Pregnant women or those with 

chronic health conditions should not take care of the flu patient unless 

necessary.


   Check with their doctor about whether or not medications are needed that 

may lessen the symptoms of the flu.


   Stay at home until 24 hours fever free without the use of fever reducing 

medication.


   Get plenty of rest and avoid other healthy people in your home.


   Drink plenty of clear liquids to keep from getting dehydrated.


   Take medications like Tylenol (Acetaminophen), Advil/Motrin/Nuprin (

Ibuprofen) or Aleve (Naproxen) for fevers and aches. All children under the age 

of 18 years of age should not take aspirin or products containing aspirin (e.g. 

Pepto Bismol), as this can cause a rare serious illness called Reye Syndrome.


   Over the counter medications for flu and colds may help, but it is very 

important to follow the package directions. Remember that the medicine may help 

the symptoms, but it will not help prevent others from getting sick if they are 

around you.


   Cover coughs and sneezes using your bent arm. Clean hands with soap and 

water or an alcohol-based hand rub often, especially after using tissues to 

cough or sneeze.


   Encourage hand washing frequently for all people living in the home!


   The sick person should not have visitors other than caregivers. Encourage 

concerned loved ones to call instead of visit.


   Avoid close contact with others-do not go to work or school while sick.








USE OF ACETAMINOPHEN (Tylenol):


     Acetaminophen may be taken for pain relief or fever control. It's much 

safer than aspirin, offering a wider range of "safe" dosages.  It is safe 

during pregnancy.  Some brand names are Tylenol, Panadol, Datril, Anacin 3, 

Tempra, and Liquiprin. Acetaminophen can be repeated every four hours.  The 

following are maximum recommended dosages:





WEIGHT         Dose             Drops                  Elixir        Chewable(

80mg)


(LBS.)                            drprs=droppers    tsp=teaspoon


6               40 mg            0.4 ml (1/2)


6-11            80 mg            0.8 ml (full)              tsp               

   1       tab


12-16         120 mg           1 1/2 drprs             3/4  tsp               1 

1/2  tabs


17-23         160 mg             2  drprs             1    tsp                 

  2       tabs


24-30         240 mg             3  drprs             1 1/2 tsp                

3       tabs


30-35         320 mg                                       2    tsp            

       4       tabs


36-41         360 mg                                       2 1/4   tsp         

     4 1/2 tabs


42-47         400 mg                                       2 1/2   tsp         

     5      tabs


48-53         480 mg                                       3    tsp            

       6      tabs


54-59         520 mg                                       3  1/4  tsp         

     6 1/2 tabs


60-64         560 mg                                       3  1/2  tsp         

     7      tabs 


65-70         600 mg                                       3  3/4  tsp         

     7 1/2 tabs


71-76         640 mg                                       4   tsp             

      8      tabs


77-82         720 mg                                       4 1/2   tsp         

    9      tabs


83-88         800 mg                                       5   tsp             

    10      tabs





>89 pounds or adults          650 mg to 900 mg





Acetaminophen can be repeated every four hours.  Maximum dose not to exceed 

4000 mg a day.





   These maximum recommended dosages are slightly higher than the dosages 

written on the product container, but these dosages are very safe and below the 

toxic dosage for acetaminophen.





FOLLOW-UP CARE:


If you have been referred to a physician for follow-up care, call the physician

s office for an appointment as you were instructed or within the next two days.

  If you experience worsening or a significant change in your symptoms, notify 

the physician immediately or return to the Emergency Department at any time for 

re-evaluation.


Prescriptions: 


Doxycycline Hyclate 100 mg PO BID #20 capsule


Forms:  Smoking Cessation Education


Referrals: 


THOMAS PARISI MD [NO LOCAL MD] - Follow up as needed

## 2018-01-04 NOTE — RADIOLOGY REPORT (SQ)
EXAM DESCRIPTION:  CHEST PA/LAT



COMPLETED DATE/TIME:  1/4/2018 4:04 pm



REASON FOR STUDY:  cough congestion fever



COMPARISON:  12/4/2017



EXAM PARAMETERS:  NUMBER OF VIEWS: two views

TECHNIQUE: Digital Frontal and Lateral radiographic views of the chest acquired.

RADIATION DOSE: NA

LIMITATIONS: none



FINDINGS:  LUNGS AND PLEURA: Subsegmental airspace disease in the left lower lobe.  No effusions.

MEDIASTINUM AND HILAR STRUCTURES: No masses or contour abnormalities.

HEART AND VASCULAR STRUCTURES: Heart normal size.  No evidence for failure.

BONES: No acute findings.

HARDWARE: None in the chest.

OTHER: No other significant finding.



IMPRESSION:  Atelectasis or early pneumonia left lower lobe.



TECHNICAL DOCUMENTATION:  JOB ID:  [id number]

 2011 [employer]- All Rights Reserved

## 2018-06-12 ENCOUNTER — HOSPITAL ENCOUNTER (EMERGENCY)
Dept: HOSPITAL 62 - ER | Age: 38
Discharge: HOME | End: 2018-06-12
Payer: SELF-PAY

## 2018-06-12 VITALS — DIASTOLIC BLOOD PRESSURE: 94 MMHG | SYSTOLIC BLOOD PRESSURE: 132 MMHG

## 2018-06-12 DIAGNOSIS — M79.604: ICD-10-CM

## 2018-06-12 DIAGNOSIS — Z90.710: ICD-10-CM

## 2018-06-12 DIAGNOSIS — Z90.49: ICD-10-CM

## 2018-06-12 DIAGNOSIS — M79.605: Primary | ICD-10-CM

## 2018-06-12 DIAGNOSIS — I10: ICD-10-CM

## 2018-06-12 DIAGNOSIS — E11.9: ICD-10-CM

## 2018-06-12 PROCEDURE — 96372 THER/PROPH/DIAG INJ SC/IM: CPT

## 2018-06-12 PROCEDURE — 99283 EMERGENCY DEPT VISIT LOW MDM: CPT

## 2018-06-12 NOTE — ER DOCUMENT REPORT
ED General





- General


Chief Complaint: Leg Pain


Stated Complaint: LEG PAIN


Time Seen by Provider: 06/12/18 11:30


TRAVEL OUTSIDE OF THE [place] IN LAST 30 DAYS: No





- HPI


Patient complains to provider of: Bilateral leg pain


Notes: 





Patient coming in for evaluation of bilateral leg pain.  States ongoing since 

last night.  Patient called EMS according EMS notes patient was amatory upon 

seen.  Patient in a wheelchair upon valuation in triage area.  Patient is 

rocking back and forth stating that most of the pain is in her lower 

extremities have around bilateral ankles.  Denies any trauma.  Patient denies 

any medical issues except that she takes Synthroid.  Patient denies fevers 

chills nausea vomiting diarrhea.  Denies any recent travel.  Patient states she 

is amatory however she has been increasing in her falls in the last few days.  

Denies any head trauma denies any other pain denies back pain.  Denies any 

numbness or tingling saddle anesthesias urinary or bowel incontinence





- Related Data


Allergies/Adverse Reactions: 


 





ketorolac [From Toradol] Allergy (Verified 01/04/18 14:46)


 


morphine Adverse Reaction (Verified 01/04/18 14:46)


 VOMITING











Past Medical History





- Social History


Smoking Status: Never Smoker


Chew tobacco use (# tins/day): No


Frequency of alcohol use: None


Drug Abuse: None


Family History: Arthritis, Malignancy, Thyroid Disfunction.  denies: CAD, COPD, 

CVA, DM, Hyperlipidemia, Hypertension


Patient has suicidal ideation: No


Patient has homicidal ideation: No





- Past Medical History


Cardiac Medical History: Reports: Hx Hypertension - Only when pregnant


Pulmonary Medical History: Reports: Hx Bronchitis


Endocrine Medical History: Reports: Hx Diabetes Mellitus Type 2 - diet control, 

Hx Hypothyroidism


Renal/ Medical History: Denies: Hx Peritoneal Dialysis


GI Medical History: Reports: Hx Gastroesophageal Reflux Disease


Musculoskeltal Medical History: Reports Hx Musculoskeletal Deformity, Reports 

Hx Musculoskeletal Trauma


Psychiatric Medical History: Reports: Hx Anxiety


Past Surgical History: Reports: Hx Cholecystectomy, Hx Gynecologic Surgery - c-

section x3, Hx Hysterectomy, Hx Thyroid Surgery - Thyroidectomy





- Immunizations


Immunizations up to date: Yes


Hx Diphtheria, Pertussis, Tetanus Vaccination: Yes





Review of Systems





- Review of Systems


Constitutional: No symptoms reported


EENT: No symptoms reported


Cardiovascular: No symptoms reported


Respiratory: No symptoms reported


Gastrointestinal: No symptoms reported


Genitourinary: No symptoms reported


Female Genitourinary: No symptoms reported


Musculoskeletal: Other - Bilateral leg pain


Skin: No symptoms reported


Hematologic/Lymphatic: No symptoms reported


Neurological/Psychological: No symptoms reported





Physical Exam





- Vital signs


Vitals: 


 











Temp Pulse Resp BP Pulse Ox


 


 98.3 F   69   16   132/94 H  98 


 


 06/12/18 11:06  06/12/18 11:06  06/12/18 11:06  06/12/18 11:06  06/12/18 11:06











Interpretation: Normal





- General


General appearance: Appears well, Alert





- HEENT


Head: Normocephalic, Atraumatic


Eyes: Normal


Pupils: PERRL





- Respiratory


Respiratory status: No respiratory distress


Chest status: Nontender


Breath sounds: Normal


Chest palpation: Normal





- Cardiovascular


Rhythm: Regular


Heart sounds: Normal auscultation


Murmur: No





- Abdominal


Inspection: Normal


Distension: No distension


Bowel sounds: Normal


Tenderness: Nontender


Organomegaly: No organomegaly





- Back


Back: Normal, Nontender





- Extremities


General upper extremity: Normal inspection, Nontender, Normal color, Normal ROM

, Normal temperature


General lower extremity: Normal inspection, Nontender, Normal color, Normal ROM

, Normal temperature, Normal weight bearing, Other - No swelling bilaterally 

palpation of the calf and ankle reveals only minimal tenderness.  This is also 

bilateral.  Patient has good dorsalis pedis and posterior tibial pulses cap 

refill normal.  Left leg does have multiple healed scars and a small tattoo on 

the lateral portion.  This is not new according to the patient.  Patient does 

have a slight area of ecchymosis above the tattoo however palpation of this is 

not painful.





- Neurological


Neuro grossly intact: Yes


Cognition: Normal


Orientation: AAOx4


Lin Coma Scale Eye Opening: Spontaneous


Lin Coma Scale Verbal: Oriented


Theodosia Coma Scale Motor: Obeys Commands


Theodosia Coma Scale Total: 15


Speech: Normal


Motor strength normal: LUE, RUE, LLE, RLE


Sensory: Normal


Knee - Reflex grade: 2 = Normal





- Psychological


Associated symptoms: Normal affect, Normal mood





- Skin


Skin Temperature: Warm


Skin Moisture: Dry


Skin Color: Normal





Course





- Re-evaluation


Re-evalutation: 





06/12/18 11:51


Patient's narcotic database shows that she recently received oxycodone tablets 

15 mg on 4 June.  Review of the records show the patient receives these 

chronically.  Upon questioning patient patient states that she does have 

chronic back issues and that she is on this medication.  This was not initially 

revealed an initial HPI.  Am concerned the patient may have some drug-seeking 

behavior as after this examination of the pain may be referred from her back 

there is no midline back tenderness no other critical pathology patient was 

able to ablate to the bathroom without difficulty.  Explained to patient that 

we will give her a single dose of Decadron to see if this will help out with 

her pain however she will need to follow-up with your primary care physician 

for further evaluation.  No critical pathology seen.  The patient presents with 

lower extremity pain bilaterally without signs of spinal cord compression, 

cauda equina syndrome, infection, aneurysm, or other serious etiology. The 

patient is neurologically intact. Given the extremely low risk of these 

diagnoses further testing and evaluation for these possibilities does not 

appear to be indicated at this time. The patient has been instructed to return 

if the symptoms worsen or change in any way.


06/12/18 12:23


Patient instructed to stay away after shot however patient left prior to this 

time was not reevaluated after shot of Decadron





- Vital Signs


Vital signs: 


 











Temp Pulse Resp BP Pulse Ox


 


 98.3 F   69   16   132/94 H  98 


 


 06/12/18 11:06  06/12/18 11:06  06/12/18 11:06  06/12/18 11:06  06/12/18 11:06














Discharge





- Discharge


Clinical Impression: 


 Bilateral leg pain





Condition: Good


Disposition: HOME, SELF-CARE


Instructions:  Leg Pain Nonspecific ([facility])


Additional Instructions: 


Your physical examination does not reveal any signs of significant pathology 

for your bilateral leg pain.  Please continue to take your prescribed oxycodone 

at home.  Would recommend following up with your primary care physician for 

further evaluation your examination is times not showing signs of DVT 

cellulitis or any other significant pathologies.  Would recommend also taking 

Tylenol Motrin for pain control





If you do have chronic back pain some of the pain that your expansion can be 

coming from your chronic issues.  This will need to be further evaluated by 

your family physician.  We gave you a dose of Decadron here today to aid in her 

symptoms.

## 2018-09-18 ENCOUNTER — HOSPITAL ENCOUNTER (EMERGENCY)
Dept: HOSPITAL 62 - ER | Age: 38
Discharge: HOME | End: 2018-09-18
Payer: SELF-PAY

## 2018-09-18 VITALS — DIASTOLIC BLOOD PRESSURE: 77 MMHG | SYSTOLIC BLOOD PRESSURE: 118 MMHG

## 2018-09-18 DIAGNOSIS — E11.9: Primary | ICD-10-CM

## 2018-09-18 DIAGNOSIS — E03.9: ICD-10-CM

## 2018-09-18 DIAGNOSIS — Z79.4: ICD-10-CM

## 2018-09-18 PROCEDURE — 82962 GLUCOSE BLOOD TEST: CPT

## 2018-09-18 PROCEDURE — 99283 EMERGENCY DEPT VISIT LOW MDM: CPT

## 2018-09-18 NOTE — ER DOCUMENT REPORT
ED General





- General


Chief Complaint: Low Blood Sugar


Stated Complaint: MEDICATION REFILL


Time Seen by Provider: 09/18/18 16:28


Information source: Patient


Notes: 





Chief complaint: Refill a prescription








History of complain:( obtained from----patient) n


Present presents today for refill of prescription, no acute complaints





Onset: As above


Duration: Long-standing


Severity: Not applicable


Quality: Not applicable


Context:


Exacerbating factor and relieving factors:











REVIEW OF SYSTEMS:


CONSTITUTIONAL :  Denies fever,  chills, or sweats.  Denies recent illness.


EENT:   Denies eye, ear, throat, or mouth pain or symptoms.  Denies nasal or 

sinus congestion or discharge.  Denies throat, tongue, or mouth swelling or 

difficulty swallowing.


CARDIOVASCULAR:  Denies chest pain.  Denies palpitations or racing or irregular 

heart beat.  Denies ankle edema.


RESPIRATORY:  Denies cough, cold, or chest congestion.  Denies shortness of 

breath, difficulty breathing, or wheezing.


GASTROINTESTINAL:  Denies  distention.  Denies nausea, vomiting, or diarrhea.  

Denies blood in vomitus, stools, or per rectum.  Denies black, tarry stools.  

Denies constipation. 


GENITOURINARY:  Denies difficulty urinating, painful urination, burning, 

frequency, blood in urine, or discharge.


FEMALE  GENITOURINARY:  Denies vaginal bleeding, heavy or abnormal periods, 

irregular periods.  Denies vaginal discharge or odor. 


MUSCULOSKELETAL:  Denies back or neck pain or stiffness.  Denies joint pain or 

swelling.


SKIN:   Denies rash, lesions or sores.


HEMATOLOGIC :   Denies easy bruising or bleeding.


LYMPHATIC:  Denies swollen, enlarged glands.


NEUROLOGICAL:  Denies confusion or altered mental status.  Denies passing out 

or loss of consciousness.  Denies dizziness or lightheadedness.  Denies 

headache.  Denies weakness or paralysis or loss of use of either side.  Denies 

problems with gait or speech.  Denies sensory loss, numbness, or tingling.  

Denies seizures.


PSYCHIATRIC:  Denies anxiety or stress.  Denies depression, suicidal ideation, 

or homicidal ideation.





ALL OTHER SYSTEMS REVIEWED AND NEGATIVE.











PHYSICAL EXAMINATION:





GENERAL: Well-appearing, well-nourished and in no acute distress.





HEAD: Atraumatic, normocephalic.





EYES: Pupils equal round and reactive to light, extraocular movements intact, 

conjunctiva are normal.





ENT: Nares patent, oropharynx clear without exudates.  Moist mucous membranes.





NECK: Normal range of motion, supple without lymphadenopathy





LUNGS: Breath sounds clear to auscultation bilaterally and equal.  No wheezes 

rales or rhonchi.





HEART: Regular rate and rhythm without murmurs





ABDOMEN: Soft, nontender, nondistended abdomen.  No guarding, no rebound.  No 

masses appreciated.





Examination of genitals-deferred





Musculoskeletal: Normal range of motion, no pitting or edema.  No cyanosis.





NEUROLOGICAL: Cranial nerves grossly intact.  Normal speech, normal gait.  

Normal sensory, motor exams





PSYCH: Normal mood, normal affect.





SKIN: Warm, Dry, normal turgor, no rashes or lesions noted.

















Dictation was performed using Dragon voice recognition software


TRAVEL OUTSIDE OF THE [place] IN LAST 30 DAYS: No





- HPI


Notes: 





Dictated





- Related Data


Allergies/Adverse Reactions: 


 





ketorolac [From Toradol] Allergy (Verified 09/18/18 14:05)


 


morphine Adverse Reaction (Verified 09/18/18 14:05)


 VOMITING











Past Medical History





- Social History


Smoking Status: Never Smoker


Chew tobacco use (# tins/day): No


Frequency of alcohol use: None


Drug Abuse: None


Family History: Reviewed & Not Pertinent, Arthritis, Malignancy, Thyroid 

Disfunction.  denies: CAD, COPD, CVA, DM, Hyperlipidemia, Hypertension


Patient has suicidal ideation: No


Patient has homicidal ideation: No





- Past Medical History


Cardiac Medical History: Reports: Hx Hypertension - Only when pregnant


Pulmonary Medical History: Reports: Hx Bronchitis


Endocrine Medical History: Reports: Hx Diabetes Mellitus Type 2 - diet control, 

Hx Hypothyroidism


Renal/ Medical History: Denies: Hx Peritoneal Dialysis


GI Medical History: Reports: Hx Gastroesophageal Reflux Disease


Musculoskeletal Medical History: Reports Hx Musculoskeletal Deformity, Reports 

Hx Musculoskeletal Trauma


Psychiatric Medical History: Reports: Hx Anxiety


Past Surgical History: Reports: Hx Cholecystectomy, Hx Gynecologic Surgery - c-

section x3, Hx Hysterectomy, Hx Thyroid Surgery - Thyroidectomy





- Immunizations


Immunizations up to date: Yes


Hx Diphtheria, Pertussis, Tetanus Vaccination: Yes





Review of Systems





- Review of Systems


Notes: 





Dictated





Physical Exam





- Vital signs


Vitals: 





 











Temp Pulse Resp BP Pulse Ox


 


 97.8 F   71   16   118/77   98 


 


 09/18/18 14:35  09/18/18 14:35  09/18/18 14:35  09/18/18 14:35  09/18/18 14:35














- Notes


Notes: 





Dictated





Course





- Vital Signs


Vital signs: 





 











Temp Pulse Resp BP Pulse Ox


 


 97.8 F   71   16   118/77   98 


 


 09/18/18 16:20  09/18/18 16:20  09/18/18 16:20  09/18/18 16:20  09/18/18 16:20














Discharge





- Discharge


Clinical Impression: 


Diabetes


Qualifiers:


 Diabetes mellitus type: type 2 Diabetes mellitus long term insulin use: with 

long term use Diabetes mellitus complication status: without complication 

Qualified Code(s): E11.9 - Type 2 diabetes mellitus without complications; 

Z79.4 - Long term (current) use of insulin; Z79.4 - Long term (current) use of 

insulin; Z79.4 - Long term (current) use of insulin; Z79.4 - Long term (current

) use of insulin





Instructions:  Diabetes ([facility])


Prescriptions: 


Levothyroxine Sodium [Synthroid 0.112 mg Tablet] 0.112 mg PO DAILY #30 tablet


Metformin HCl [Metformin HCl ER] 1,000 mg PO DAILY #30 [id number]


Metoprolol Tartrate 25 mg PO BID #60 tablet

## 2019-08-11 NOTE — RADIOLOGY REPORT ([facility])
EXAM DESCRIPTION:  CHEST PA/LAT



COMPLETED DATE/TIME:  12/4/2017 7:55 pm



REASON FOR STUDY:  cough/pain



COMPARISON:  November 2013



EXAM PARAMETERS:  NUMBER OF VIEWS: two views

TECHNIQUE: Digital Frontal and Lateral radiographic views of the chest acquired.

RADIATION DOSE: NA

LIMITATIONS: none



FINDINGS:  LUNGS AND PLEURA: No opacities, masses or pneumothorax. No pleural effusion.

MEDIASTINUM AND HILAR STRUCTURES: No masses or contour abnormalities.

HEART AND VASCULAR STRUCTURES: Heart normal size.  No evidence for failure.

BONES: No acute findings.

HARDWARE: None in the chest.

OTHER: No other significant finding.



IMPRESSION:  NO SIGNIFICANT RADIOGRAPHIC FINDING IN THE CHEST.



TECHNICAL DOCUMENTATION:  JOB ID:  [id number]

 2011 [employer]- All Rights Reserved
throat, pain